# Patient Record
Sex: FEMALE | Race: WHITE | NOT HISPANIC OR LATINO
[De-identification: names, ages, dates, MRNs, and addresses within clinical notes are randomized per-mention and may not be internally consistent; named-entity substitution may affect disease eponyms.]

---

## 2018-07-23 ENCOUNTER — APPOINTMENT (OUTPATIENT)
Dept: RADIOLOGY | Facility: IMAGING CENTER | Age: 58
End: 2018-07-23
Payer: COMMERCIAL

## 2018-07-23 ENCOUNTER — OUTPATIENT (OUTPATIENT)
Dept: OUTPATIENT SERVICES | Facility: HOSPITAL | Age: 58
LOS: 1 days | End: 2018-07-23
Payer: COMMERCIAL

## 2018-07-23 ENCOUNTER — APPOINTMENT (OUTPATIENT)
Dept: MAMMOGRAPHY | Facility: IMAGING CENTER | Age: 58
End: 2018-07-23
Payer: COMMERCIAL

## 2018-07-23 DIAGNOSIS — Z00.8 ENCOUNTER FOR OTHER GENERAL EXAMINATION: ICD-10-CM

## 2018-07-23 PROCEDURE — 77067 SCR MAMMO BI INCL CAD: CPT | Mod: 26

## 2018-07-23 PROCEDURE — 77063 BREAST TOMOSYNTHESIS BI: CPT

## 2018-07-23 PROCEDURE — 77063 BREAST TOMOSYNTHESIS BI: CPT | Mod: 26

## 2018-07-23 PROCEDURE — 71046 X-RAY EXAM CHEST 2 VIEWS: CPT

## 2018-07-23 PROCEDURE — 71046 X-RAY EXAM CHEST 2 VIEWS: CPT | Mod: 26

## 2018-07-23 PROCEDURE — 77067 SCR MAMMO BI INCL CAD: CPT

## 2021-08-17 ENCOUNTER — APPOINTMENT (OUTPATIENT)
Dept: RADIOLOGY | Facility: IMAGING CENTER | Age: 61
End: 2021-08-17
Payer: OTHER GOVERNMENT

## 2021-08-17 ENCOUNTER — OUTPATIENT (OUTPATIENT)
Dept: OUTPATIENT SERVICES | Facility: HOSPITAL | Age: 61
LOS: 1 days | End: 2021-08-17
Payer: COMMERCIAL

## 2021-08-17 DIAGNOSIS — M47.816 SPONDYLOSIS WITHOUT MYELOPATHY OR RADICULOPATHY, LUMBAR REGION: ICD-10-CM

## 2021-08-17 PROCEDURE — 72110 X-RAY EXAM L-2 SPINE 4/>VWS: CPT | Mod: 26

## 2021-08-17 PROCEDURE — 72110 X-RAY EXAM L-2 SPINE 4/>VWS: CPT

## 2022-11-28 ENCOUNTER — RESULT CHARGE (OUTPATIENT)
Age: 62
End: 2022-11-28

## 2022-11-28 ENCOUNTER — NON-APPOINTMENT (OUTPATIENT)
Age: 62
End: 2022-11-28

## 2022-11-28 ENCOUNTER — APPOINTMENT (OUTPATIENT)
Dept: PULMONOLOGY | Facility: CLINIC | Age: 62
End: 2022-11-28

## 2022-11-28 VITALS
RESPIRATION RATE: 16 BRPM | OXYGEN SATURATION: 93 % | HEART RATE: 86 BPM | WEIGHT: 124 LBS | SYSTOLIC BLOOD PRESSURE: 118 MMHG | BODY MASS INDEX: 17.29 KG/M2 | DIASTOLIC BLOOD PRESSURE: 74 MMHG

## 2022-11-28 DIAGNOSIS — R93.89 ABNORMAL FINDINGS ON DIAGNOSTIC IMAGING OF OTHER SPECIFIED BODY STRUCTURES: ICD-10-CM

## 2022-11-28 LAB — POCT - HEMOGLOBIN (HGB), QUANTITATIVE, TRANSCUTANEOUS: 12.1

## 2022-11-28 PROCEDURE — 94727 GAS DIL/WSHOT DETER LNG VOL: CPT

## 2022-11-28 PROCEDURE — 71046 X-RAY EXAM CHEST 2 VIEWS: CPT

## 2022-11-28 PROCEDURE — 94010 BREATHING CAPACITY TEST: CPT

## 2022-11-28 PROCEDURE — 99205 OFFICE O/P NEW HI 60 MIN: CPT | Mod: 25

## 2022-11-28 PROCEDURE — 99406 BEHAV CHNG SMOKING 3-10 MIN: CPT

## 2022-11-28 PROCEDURE — 99072 ADDL SUPL MATRL&STAF TM PHE: CPT

## 2022-11-28 PROCEDURE — 94729 DIFFUSING CAPACITY: CPT

## 2022-11-28 RX ORDER — LOSARTAN POTASSIUM 25 MG/1
25 TABLET, FILM COATED ORAL
Qty: 90 | Refills: 0 | Status: ACTIVE | COMMUNITY
Start: 2022-11-02

## 2022-11-28 RX ORDER — ALENDRONATE SODIUM 70 MG/1
70 TABLET ORAL
Qty: 4 | Refills: 0 | Status: ACTIVE | COMMUNITY
Start: 2022-11-02

## 2022-11-28 RX ORDER — ATORVASTATIN CALCIUM 40 MG/1
40 TABLET, FILM COATED ORAL
Qty: 90 | Refills: 0 | Status: ACTIVE | COMMUNITY
Start: 2022-09-02

## 2022-11-28 RX ORDER — TIOTROPIUM BROMIDE INHALATION SPRAY 3.12 UG/1
2.5 SPRAY, METERED RESPIRATORY (INHALATION) DAILY
Qty: 1 | Refills: 5 | Status: ACTIVE | COMMUNITY
Start: 2022-11-28 | End: 1900-01-01

## 2022-11-28 RX ORDER — AZITHROMYCIN 250 MG/1
250 TABLET, FILM COATED ORAL
Qty: 6 | Refills: 0 | Status: DISCONTINUED | COMMUNITY
Start: 2022-11-21 | End: 2022-11-28

## 2022-11-28 RX ORDER — VARENICLINE 1 MG/1
1 TABLET, FILM COATED ORAL
Qty: 60 | Refills: 0 | Status: DISCONTINUED | COMMUNITY
Start: 2022-06-08

## 2022-11-28 RX ORDER — BENZONATATE 100 MG/1
100 CAPSULE ORAL
Qty: 45 | Refills: 0 | Status: ACTIVE | COMMUNITY
Start: 2022-11-21

## 2022-11-28 RX ORDER — ALPRAZOLAM 1 MG/1
1 TABLET ORAL
Qty: 120 | Refills: 0 | Status: ACTIVE | COMMUNITY
Start: 2022-11-01

## 2022-11-28 NOTE — REVIEW OF SYSTEMS
[Negative] : Psychiatric [TextBox_3] : HPI [TextBox_30] : HPI [TextBox_44] : HTN [TextBox_144] : osteoporosis

## 2022-11-28 NOTE — REASON FOR VISIT
[Consultation] : a consultation [Abnormal CXR/ Chest CT] : an abnormal CXR/ chest CT [COPD] : COPD [TextBox_44] : s/u URI

## 2022-11-28 NOTE — CONSULT LETTER
[Dear  ___] : Dear ~ROSHAN, [Consult Letter:] : I had the pleasure of evaluating your patient, [unfilled]. [Consult Closing:] : Thank you very much for allowing me to participate in the care of this patient.  If you have any questions, please do not hesitate to contact me. [Sincerely,] : Sincerely, [FreeTextEntry2] : Drew Mccarty MD\par  [FreeTextEntry3] : Bryon Montoya MD FCCP\par

## 2022-11-28 NOTE — PROCEDURE
[FreeTextEntry1] : 11/28/2022\par Pulmonary function testing\par These data demonstrate a mild obstructive ventilatory deficit. There is evidence of mild hyperinflation. There is elevation in the RV/TLC ratio indicative of possible air trapping. There is a mild diffusion impairment. \par \par chest xray from November 22, 2022 reviewed and 2018 compared\par

## 2022-11-28 NOTE — COUNSELING
[Cessation strategies including cessation program discussed] : Cessation strategies including cessation program discussed [Use of nicotine replacement therapies and other medications discussed] : Use of nicotine replacement therapies and other medications discussed [Encouraged to pick a quit date and identify support needed to quit] : Encouraged to pick a quit date and identify support needed to quit [Smoking Cessation Program Referral] : Smoking Cessation Program Referral  [Yes] : Willing to quit smoking

## 2022-11-28 NOTE — DISCUSSION/SUMMARY
[FreeTextEntry1] : COPD mild\par Current every day smoker\par S/p pneumonia.  Clinically and radiographically essentially resolved.\par Chronic cough likely related to tobacco use and mild COPD.

## 2022-11-28 NOTE — ASSESSMENT
[FreeTextEntry1] : Trial Spiriva.  Instructed in proper use.\par F/U 1 year or sooner on a PRN basis.\par Smoking Cessation discussed.\par Yearly screening CT.\par No indication for further antibiotic therapy.\par Will follow-up sooner on a PRN basis.

## 2022-11-28 NOTE — HISTORY OF PRESENT ILLNESS
[Current] : current [>= 20 pack years] : >= 20 pack years [TextBox_4] : SAMINA VANESSA is a 61 year old  F referred for pulmonary evaluation for\par \par About 5 weeks ago had head cold.\par Then developed phlegm thick white.\par Persisted.\par 10 days ago developed purulent secretions and temp to 102.1. Went to PMD sent for CXR told pneumonia and bronchitis.\par Completed zithro and on Benzonatate.\par \par No feels better. Feels essentially baseline\par Mild GREENBERG. GREENBERG approx. 2 flights. \par Has COPD.\par Positive chronic cough.\par \par Past pulmonary history. COPD. \par Occupational Exposure. N\par Family history of pulmonary disease. Grandfather lung carcinoma.\par Recent travel N\par Pets N\par \par Patient gets yearly CT.\par Positive exercise walking. \par \par  [TextBox_11] : .75 [TextBox_13] : 50

## 2023-06-15 ENCOUNTER — EMERGENCY (EMERGENCY)
Facility: HOSPITAL | Age: 63
LOS: 1 days | Discharge: HOME | End: 2023-06-15
Admitting: EMERGENCY MEDICINE
Payer: COMMERCIAL

## 2023-06-15 VITALS
SYSTOLIC BLOOD PRESSURE: 144 MMHG | RESPIRATION RATE: 17 BRPM | HEART RATE: 66 BPM | DIASTOLIC BLOOD PRESSURE: 81 MMHG | OXYGEN SATURATION: 100 % | TEMPERATURE: 98 F

## 2023-06-15 PROCEDURE — 99284 EMERGENCY DEPT VISIT MOD MDM: CPT

## 2023-06-15 RX ORDER — AZITHROMYCIN 500 MG/1
1 TABLET, FILM COATED ORAL
Qty: 1 | Refills: 0
Start: 2023-06-15 | End: 2023-06-19

## 2023-06-15 NOTE — ED PROVIDER NOTE - OBJECTIVE STATEMENT
61 Y/O F w/ PMH of COPD and current every day smoker presents to ER for cough. Has experienced TMAX of 101 oral since yesterday w/ associated runny nose and frontal headache. Taking Tylenol and Theraflu w/ minimal relief. This morning noted pulse ox of 91 when getting out of bed. Contacted PMD who recommended ER for further evaluation. No recent travel or sick contacts. Denies chills, nausea/vomiting/diarrhea, chest pain, palpitations, shortness of breath, abdominal pain, dysuria

## 2023-06-15 NOTE — ED PROVIDER NOTE - PATIENT PORTAL LINK FT
You can access the FollowMyHealth Patient Portal offered by [facility] by registering at the following website: [email or web address]. By joining [employer]’s FollowMyHealth portal, you will also be able to view your health information using other applications (apps) compatible with our system.

## 2023-06-15 NOTE — ED ADULT TRIAGE NOTE - CHIEF COMPLAINT QUOTE
Pt c/o cough and congestion starting yesterday, Pt was worried because 02 saturation at home was 89%. Pt denies SOB. Pt 02 saturation currently 100% RA, Pt very well appearing, Phx COPD

## 2023-06-15 NOTE — ED PROVIDER NOTE - NS ED ROS FT
Constitutional: (-) fever   Head: Normal cephalic, Atraumatic  Eyes/ENT: (-)   Cardiovascular: (-) chest pain, (-) wheezing  Respiratory: (+) cough, (-) shortness of breath  Gastrointestinal: (-) vomiting, (-) diarrhea, (-) abdominal pain  : (-) dysuria   Musculoskeletal: (-) back pain  Integumentary: (-) rash, (-) edema  Neurological: (-)loc  Allergic/Immunologic: (-) pruritus

## 2023-06-15 NOTE — ED PROVIDER NOTE - CLINICAL SUMMARY MEDICAL DECISION MAKING FREE TEXT BOX
63 Y/O F w/ PMH of COPD and current every day smoker presents to ER for cough.   No acute findings on exam  Noted to have oxygen sat at 100% during interview  No work of breathing, accessory muscle use, no acute distress  Declined XR. Will send abx given COPD hx

## 2023-06-29 ENCOUNTER — APPOINTMENT (OUTPATIENT)
Dept: PULMONOLOGY | Facility: CLINIC | Age: 63
End: 2023-06-29
Payer: OTHER GOVERNMENT

## 2023-06-29 VITALS
SYSTOLIC BLOOD PRESSURE: 103 MMHG | HEART RATE: 62 BPM | WEIGHT: 125 LBS | BODY MASS INDEX: 17.5 KG/M2 | OXYGEN SATURATION: 95 % | HEIGHT: 71 IN | DIASTOLIC BLOOD PRESSURE: 65 MMHG

## 2023-06-29 DIAGNOSIS — F17.200 NICOTINE DEPENDENCE, UNSPECIFIED, UNCOMPLICATED: ICD-10-CM

## 2023-06-29 DIAGNOSIS — J44.1 CHRONIC OBSTRUCTIVE PULMONARY DISEASE WITH (ACUTE) EXACERBATION: ICD-10-CM

## 2023-06-29 PROCEDURE — 99407 BEHAV CHNG SMOKING > 10 MIN: CPT

## 2023-06-29 PROCEDURE — 71046 X-RAY EXAM CHEST 2 VIEWS: CPT

## 2023-06-29 PROCEDURE — 99214 OFFICE O/P EST MOD 30 MIN: CPT | Mod: 25

## 2023-06-29 RX ORDER — PREDNISONE 10 MG/1
10 TABLET ORAL
Qty: 12 | Refills: 0 | Status: ACTIVE | COMMUNITY
Start: 2023-06-29 | End: 1900-01-01

## 2023-06-29 RX ORDER — AZITHROMYCIN 250 MG/1
250 TABLET, FILM COATED ORAL
Qty: 1 | Refills: 0 | Status: ACTIVE | COMMUNITY
Start: 2023-06-29 | End: 1900-01-01

## 2023-06-30 RX ORDER — TIOTROPIUM BROMIDE INHALATION SPRAY 3.12 UG/1
2.5 SPRAY, METERED RESPIRATORY (INHALATION)
Qty: 3 | Refills: 5 | Status: ACTIVE | COMMUNITY
Start: 2023-06-30 | End: 1900-01-01

## 2023-07-01 PROBLEM — F17.200 CURRENT EVERY DAY SMOKER: Status: ACTIVE | Noted: 2022-11-28

## 2023-07-01 NOTE — ASSESSMENT
[FreeTextEntry1] : 15 minutes spent in cigarette smoking cessation counseling. Educated patient on deleterious effects and all potential consequences of cigarette smoking, such as COPD, lung cancer, etc. Counseled patient on various smoking cessation techniques, such as nicotine patch, Zyban, acupuncture, etc.  Patient agreed to attempt cigarette smoking cessation.  Will follow up on cigarette smoking cessation on next office visit.\par Start Z-Goyo and prednisone taper for COPD exacerbation.\par Start Incruse for COPD.\par Return for pulmonary follow-up in 3 weeks.\par

## 2023-07-01 NOTE — HISTORY OF PRESENT ILLNESS
[TextBox_4] : Acute visit for cough\par \par Having productive cough x 2 1/2 weeks; was seen in [facility] ER due to low oxygen levels and then was placed on antibiotics by PCP; was feeling better, however, started to have a cough again about 1 week again

## 2023-07-01 NOTE — PROCEDURE
[FreeTextEntry1] : \par  Xray Chest 2 Views PA/Lat             Final\par \par No Documents Attached\par \par \par \par \par   \par Chest x-ray PA and lateral views performed in my office today showed bilateral hyperinflated lungs, consistent with history of COPD, clear lungs, no evidence of infiltrates or pleural effusions. \par \par \par  Ordered by: TESSA JACK       Collected/Examined: 29Jun2023 04:49PM       \par Verification Required       Stage: Final       \par  Performed at: In Office       Performed by: TESSA JACK       Resulted: 29Jun2023 04:49PM       Last Updated: 29Jun2023 04:50PM

## 2023-07-10 ENCOUNTER — APPOINTMENT (OUTPATIENT)
Dept: PULMONOLOGY | Facility: CLINIC | Age: 63
End: 2023-07-10
Payer: OTHER GOVERNMENT

## 2023-07-10 VITALS
HEART RATE: 66 BPM | SYSTOLIC BLOOD PRESSURE: 119 MMHG | DIASTOLIC BLOOD PRESSURE: 73 MMHG | RESPIRATION RATE: 16 BRPM | OXYGEN SATURATION: 94 %

## 2023-07-10 DIAGNOSIS — J44.9 CHRONIC OBSTRUCTIVE PULMONARY DISEASE, UNSPECIFIED: ICD-10-CM

## 2023-07-10 DIAGNOSIS — R05.9 COUGH, UNSPECIFIED: ICD-10-CM

## 2023-07-10 DIAGNOSIS — R91.8 OTHER NONSPECIFIC ABNORMAL FINDING OF LUNG FIELD: ICD-10-CM

## 2023-07-10 LAB — POCT - HEMOGLOBIN (HGB), QUANTITATIVE, TRANSCUTANEOUS: 14.4

## 2023-07-10 PROCEDURE — 88738 HGB QUANT TRANSCUTANEOUS: CPT

## 2023-07-10 PROCEDURE — 94729 DIFFUSING CAPACITY: CPT

## 2023-07-10 PROCEDURE — 99213 OFFICE O/P EST LOW 20 MIN: CPT | Mod: 25

## 2023-07-10 PROCEDURE — 94727 GAS DIL/WSHOT DETER LNG VOL: CPT

## 2023-07-10 PROCEDURE — ZZZZZ: CPT

## 2023-07-10 PROCEDURE — 94618 PULMONARY STRESS TESTING: CPT

## 2023-07-10 PROCEDURE — 94010 BREATHING CAPACITY TEST: CPT

## 2023-07-10 RX ORDER — UMECLIDINIUM 62.5 UG/1
62.5 AEROSOL, POWDER ORAL
Qty: 3 | Refills: 3 | Status: DISCONTINUED | COMMUNITY
Start: 2023-06-29 | End: 2023-07-10

## 2023-07-12 RX ORDER — LEVALBUTEROL TARTRATE 45 UG/1
45 AEROSOL, METERED ORAL
Qty: 3 | Refills: 3 | Status: ACTIVE | COMMUNITY
Start: 2023-07-12 | End: 1900-01-01

## 2023-07-12 NOTE — PROCEDURE
[FreeTextEntry1] : 07/11/2023\par Pulmonary function testing\par FEV1, FVC, and FEV1/FVC are within normal limits. TLC and subdivisions are increased. RV/TLC ratio is increased. Single breath diffusion capacity is normal. \par Compared to November 28, 2022 there is a moderate increase in FEV1.  Minimal change in FVC.  Moderate increase in diffusion capacity.\par \par July 10, 2023 6-minute walk.\par Intact.  No desaturation.

## 2023-07-12 NOTE — ASSESSMENT
[FreeTextEntry1] : Trial of Breztri 2 BID.  Instructed in proper use.\par Albuterol PRN. \par Smoking Cessation discussed. Has quit in past with patch and gum, suggest to retry\par Obtain sputum culture.\par Hold off on further antibiotic therapy pending sputum culture.\par Continue yearly screening CT program.  Due June 2024.\par Will follow-up sooner on a PRN basis.\par \par

## 2023-07-12 NOTE — REVIEW OF SYSTEMS
[Cough] : cough [Dyspnea] : dyspnea [Negative] : Endocrine [TextBox_3] : HPI [TextBox_30] : HPI [TextBox_44] : HTN [TextBox_144] : osteoporosis

## 2023-07-12 NOTE — HISTORY OF PRESENT ILLNESS
[Current] : current [TextBox_4] : \par \par Seen in ER and discharged. \par saw Dr Deshpande end of June with productive cough x 2 1/2 weeks; was seen in [facility]  and given a z Goyo\par took prednisone roselia and started Spiriva 2 puffs daily and finished z Goyo\par still smoking\par  1 PPD\par still with colored mucus slight yellow\par now O2 SATs normal\par no wheeze\par does not have albuterol\par did get screening ct [facility] 6/23 stable nodules, 9 mm nodule right light 3 mm right middle lobe,stable 3 mm nodule along left major fissure Mild airway inflammation. emphysema \par Cough is baseline but mucous is unusual. \par Chest heaviness now resolved. \par \par Had TIA 6/5/23 getting workup. \par Voice a little raspy just since ill. \par \par \par  [TextBox_11] : 1 [TextBox_13] : 50

## 2023-07-12 NOTE — REASON FOR VISIT
[Consultation] : a consultation [Abnormal CXR/ Chest CT] : an abnormal CXR/ chest CT [Cough] : cough [COPD] : COPD [Nicotine Dependence] : nicotine dependence [TextBox_44] : s/u URI

## 2023-10-23 ENCOUNTER — APPOINTMENT (OUTPATIENT)
Dept: PULMONOLOGY | Facility: CLINIC | Age: 63
End: 2023-10-23

## 2024-03-06 ENCOUNTER — RX RENEWAL (OUTPATIENT)
Age: 64
End: 2024-03-06

## 2024-03-11 RX ORDER — ALBUTEROL SULFATE 90 UG/1
108 (90 BASE) INHALANT RESPIRATORY (INHALATION)
Qty: 1 | Refills: 1 | Status: ACTIVE | COMMUNITY
Start: 2023-07-10 | End: 1900-01-01

## 2024-04-12 ENCOUNTER — RX RENEWAL (OUTPATIENT)
Age: 64
End: 2024-04-12

## 2024-04-12 RX ORDER — BUDESONIDE, GLYCOPYRROLATE, AND FORMOTEROL FUMARATE 160; 9; 4.8 UG/1; UG/1; UG/1
160-9-4.8 AEROSOL, METERED RESPIRATORY (INHALATION)
Qty: 10.7 | Refills: 5 | Status: ACTIVE | COMMUNITY
Start: 2023-07-10 | End: 1900-01-01

## 2024-04-26 ENCOUNTER — APPOINTMENT (OUTPATIENT)
Dept: PULMONOLOGY | Facility: CLINIC | Age: 64
End: 2024-04-26

## 2024-07-19 ENCOUNTER — APPOINTMENT (OUTPATIENT)
Dept: PULMONOLOGY | Facility: CLINIC | Age: 64
End: 2024-07-19
Payer: OTHER GOVERNMENT

## 2024-07-19 VITALS
HEIGHT: 71 IN | SYSTOLIC BLOOD PRESSURE: 118 MMHG | WEIGHT: 120 LBS | BODY MASS INDEX: 16.8 KG/M2 | DIASTOLIC BLOOD PRESSURE: 63 MMHG | HEART RATE: 52 BPM | OXYGEN SATURATION: 99 %

## 2024-07-19 DIAGNOSIS — R91.8 OTHER NONSPECIFIC ABNORMAL FINDING OF LUNG FIELD: ICD-10-CM

## 2024-07-19 DIAGNOSIS — J44.9 CHRONIC OBSTRUCTIVE PULMONARY DISEASE, UNSPECIFIED: ICD-10-CM

## 2024-07-19 LAB — POCT - HEMOGLOBIN (HGB), QUANTITATIVE, TRANSCUTANEOUS: 14.3

## 2024-07-19 PROCEDURE — ZZZZZ: CPT

## 2024-07-19 PROCEDURE — 99214 OFFICE O/P EST MOD 30 MIN: CPT | Mod: 25

## 2024-07-19 PROCEDURE — 94729 DIFFUSING CAPACITY: CPT

## 2024-07-19 PROCEDURE — 94010 BREATHING CAPACITY TEST: CPT

## 2024-07-19 PROCEDURE — 88738 HGB QUANT TRANSCUTANEOUS: CPT

## 2024-07-19 PROCEDURE — 94727 GAS DIL/WSHOT DETER LNG VOL: CPT

## 2024-11-04 ENCOUNTER — APPOINTMENT (OUTPATIENT)
Dept: PULMONOLOGY | Facility: CLINIC | Age: 64
End: 2024-11-04
Payer: COMMERCIAL

## 2024-11-04 VITALS — SYSTOLIC BLOOD PRESSURE: 117 MMHG | DIASTOLIC BLOOD PRESSURE: 72 MMHG | HEART RATE: 50 BPM | OXYGEN SATURATION: 97 %

## 2024-11-04 DIAGNOSIS — J44.9 CHRONIC OBSTRUCTIVE PULMONARY DISEASE, UNSPECIFIED: ICD-10-CM

## 2024-11-04 DIAGNOSIS — J44.1 CHRONIC OBSTRUCTIVE PULMONARY DISEASE WITH (ACUTE) EXACERBATION: ICD-10-CM

## 2024-11-04 PROCEDURE — 99214 OFFICE O/P EST MOD 30 MIN: CPT | Mod: 25

## 2024-11-04 PROCEDURE — 94010 BREATHING CAPACITY TEST: CPT

## 2024-11-04 PROCEDURE — 71046 X-RAY EXAM CHEST 2 VIEWS: CPT

## 2024-11-04 RX ORDER — PREDNISONE 10 MG/1
10 TABLET ORAL
Qty: 30 | Refills: 0 | Status: ACTIVE | COMMUNITY
Start: 2024-11-04 | End: 1900-01-01

## 2024-12-13 ENCOUNTER — APPOINTMENT (OUTPATIENT)
Dept: PULMONOLOGY | Facility: CLINIC | Age: 64
End: 2024-12-13

## 2025-02-11 RX ORDER — FLUTICASONE FUROATE, UMECLIDINIUM BROMIDE AND VILANTEROL TRIFENATATE 200; 62.5; 25 UG/1; UG/1; UG/1
200-62.5-25 POWDER RESPIRATORY (INHALATION)
Qty: 1 | Refills: 5 | Status: ACTIVE | COMMUNITY
Start: 2025-02-11 | End: 1900-01-01

## 2025-05-02 ENCOUNTER — NON-APPOINTMENT (OUTPATIENT)
Age: 65
End: 2025-05-02

## 2025-05-02 ENCOUNTER — APPOINTMENT (OUTPATIENT)
Dept: PULMONOLOGY | Facility: CLINIC | Age: 65
End: 2025-05-02
Payer: OTHER GOVERNMENT

## 2025-05-02 VITALS — OXYGEN SATURATION: 94 % | HEART RATE: 58 BPM | DIASTOLIC BLOOD PRESSURE: 75 MMHG | SYSTOLIC BLOOD PRESSURE: 124 MMHG

## 2025-05-02 DIAGNOSIS — J44.9 CHRONIC OBSTRUCTIVE PULMONARY DISEASE, UNSPECIFIED: ICD-10-CM

## 2025-05-02 DIAGNOSIS — R93.89 ABNORMAL FINDINGS ON DIAGNOSTIC IMAGING OF OTHER SPECIFIED BODY STRUCTURES: ICD-10-CM

## 2025-05-02 LAB
FLUAV SPEC QL CULT: NEGATIVE
FLUBV AG SPEC QL IA: NEGATIVE
S PYO AG SPEC QL IA: NEGATIVE
SARS-COV-2 AG RESP QL IA.RAPID: NEGATIVE

## 2025-05-02 PROCEDURE — 99214 OFFICE O/P EST MOD 30 MIN: CPT

## 2025-05-02 PROCEDURE — 87804 INFLUENZA ASSAY W/OPTIC: CPT | Mod: QW

## 2025-05-02 PROCEDURE — 87880 STREP A ASSAY W/OPTIC: CPT | Mod: QW

## 2025-05-02 PROCEDURE — 71046 X-RAY EXAM CHEST 2 VIEWS: CPT

## 2025-05-02 PROCEDURE — G2211 COMPLEX E/M VISIT ADD ON: CPT

## 2025-05-02 PROCEDURE — 87811 SARS-COV-2 COVID19 W/OPTIC: CPT | Mod: QW

## 2025-05-02 RX ORDER — LEVOFLOXACIN 500 MG/1
500 TABLET, FILM COATED ORAL DAILY
Qty: 7 | Refills: 0 | Status: ACTIVE | COMMUNITY
Start: 2025-05-02 | End: 1900-01-01

## 2025-06-03 ENCOUNTER — APPOINTMENT (OUTPATIENT)
Dept: PULMONOLOGY | Facility: CLINIC | Age: 65
End: 2025-06-03
Payer: OTHER GOVERNMENT

## 2025-06-03 VITALS — DIASTOLIC BLOOD PRESSURE: 70 MMHG | SYSTOLIC BLOOD PRESSURE: 110 MMHG | HEART RATE: 63 BPM | OXYGEN SATURATION: 98 %

## 2025-06-03 DIAGNOSIS — R05.9 COUGH, UNSPECIFIED: ICD-10-CM

## 2025-06-03 DIAGNOSIS — J01.90 ACUTE SINUSITIS, UNSPECIFIED: ICD-10-CM

## 2025-06-03 DIAGNOSIS — J44.9 CHRONIC OBSTRUCTIVE PULMONARY DISEASE, UNSPECIFIED: ICD-10-CM

## 2025-06-03 DIAGNOSIS — G47.33 OBSTRUCTIVE SLEEP APNEA (ADULT) (PEDIATRIC): ICD-10-CM

## 2025-06-03 PROCEDURE — 94010 BREATHING CAPACITY TEST: CPT

## 2025-06-03 PROCEDURE — 99214 OFFICE O/P EST MOD 30 MIN: CPT | Mod: 25

## 2025-06-03 RX ORDER — METHYLPREDNISOLONE 4 MG/1
4 TABLET ORAL
Qty: 1 | Refills: 1 | Status: ACTIVE | COMMUNITY
Start: 2025-06-03 | End: 1900-01-01

## 2025-06-03 RX ORDER — AZITHROMYCIN 250 MG/1
250 TABLET, FILM COATED ORAL
Qty: 10 | Refills: 0 | Status: ACTIVE | COMMUNITY
Start: 2025-06-03 | End: 1900-01-01

## 2025-06-04 PROBLEM — J01.90 ACUTE NON-RECURRENT SINUSITIS, UNSPECIFIED LOCATION: Status: ACTIVE | Noted: 2025-06-04 | Resolved: 2025-07-04

## 2025-06-13 ENCOUNTER — NON-APPOINTMENT (OUTPATIENT)
Age: 65
End: 2025-06-13

## 2025-06-13 RX ORDER — CIPROFLOXACIN HYDROCHLORIDE 500 MG/1
500 TABLET, FILM COATED ORAL
Qty: 14 | Refills: 0 | Status: ACTIVE | COMMUNITY
Start: 2025-06-13 | End: 1900-01-01

## 2025-06-19 ENCOUNTER — APPOINTMENT (OUTPATIENT)
Dept: OTOLARYNGOLOGY | Facility: CLINIC | Age: 65
End: 2025-06-19
Payer: OTHER GOVERNMENT

## 2025-06-19 VITALS
BODY MASS INDEX: 18.22 KG/M2 | SYSTOLIC BLOOD PRESSURE: 131 MMHG | TEMPERATURE: 98 F | HEIGHT: 69 IN | WEIGHT: 123 LBS | HEART RATE: 75 BPM | DIASTOLIC BLOOD PRESSURE: 81 MMHG

## 2025-06-19 PROBLEM — R44.2 PHANTOSMIA: Status: ACTIVE | Noted: 2025-06-19

## 2025-06-19 PROBLEM — R09.81 NASAL CONGESTION: Status: ACTIVE | Noted: 2025-06-19

## 2025-06-19 PROBLEM — R09.82 PND (POST-NASAL DRIP): Status: ACTIVE | Noted: 2025-06-19

## 2025-06-19 PROBLEM — J34.2 DNS (DEVIATED NASAL SEPTUM): Status: ACTIVE | Noted: 2025-06-19

## 2025-06-19 PROBLEM — J34.89 SINUS PRESSURE: Status: ACTIVE | Noted: 2025-06-19

## 2025-06-19 PROCEDURE — 31231 NASAL ENDOSCOPY DX: CPT

## 2025-06-19 PROCEDURE — 99203 OFFICE O/P NEW LOW 30 MIN: CPT | Mod: 25

## 2025-06-19 RX ORDER — AZELASTINE HYDROCHLORIDE 137 UG/1
0.1 SPRAY, METERED NASAL TWICE DAILY
Qty: 3 | Refills: 3 | Status: ACTIVE | COMMUNITY
Start: 2025-06-19 | End: 1900-01-01

## 2025-06-20 ENCOUNTER — EMERGENCY (EMERGENCY)
Facility: HOSPITAL | Age: 65
LOS: 1 days | End: 2025-06-20
Attending: EMERGENCY MEDICINE | Admitting: EMERGENCY MEDICINE
Payer: COMMERCIAL

## 2025-06-20 VITALS
WEIGHT: 121.92 LBS | RESPIRATION RATE: 16 BRPM | TEMPERATURE: 98 F | HEART RATE: 78 BPM | DIASTOLIC BLOOD PRESSURE: 76 MMHG | OXYGEN SATURATION: 96 % | SYSTOLIC BLOOD PRESSURE: 118 MMHG

## 2025-06-20 LAB
ALBUMIN SERPL ELPH-MCNC: 3.8 G/DL — SIGNIFICANT CHANGE UP (ref 3.3–5)
ALP SERPL-CCNC: 55 U/L — SIGNIFICANT CHANGE UP (ref 40–120)
ALT FLD-CCNC: 21 U/L — SIGNIFICANT CHANGE UP (ref 4–33)
ANION GAP SERPL CALC-SCNC: 9 MMOL/L — SIGNIFICANT CHANGE UP (ref 7–14)
AST SERPL-CCNC: 30 U/L — SIGNIFICANT CHANGE UP (ref 4–32)
BASOPHILS # BLD AUTO: 0.06 K/UL — SIGNIFICANT CHANGE UP (ref 0–0.2)
BASOPHILS NFR BLD AUTO: 0.7 % — SIGNIFICANT CHANGE UP (ref 0–2)
BILIRUB SERPL-MCNC: 0.4 MG/DL — SIGNIFICANT CHANGE UP (ref 0.2–1.2)
BLOOD GAS VENOUS COMPREHENSIVE RESULT: SIGNIFICANT CHANGE UP
BUN SERPL-MCNC: 12 MG/DL — SIGNIFICANT CHANGE UP (ref 7–23)
CALCIUM SERPL-MCNC: 9.1 MG/DL — SIGNIFICANT CHANGE UP (ref 8.4–10.5)
CHLORIDE SERPL-SCNC: 105 MMOL/L — SIGNIFICANT CHANGE UP (ref 98–107)
CO2 SERPL-SCNC: 24 MMOL/L — SIGNIFICANT CHANGE UP (ref 22–31)
CREAT SERPL-MCNC: 0.7 MG/DL — SIGNIFICANT CHANGE UP (ref 0.5–1.3)
EGFR: 95 ML/MIN/1.73M2 — SIGNIFICANT CHANGE UP
EGFR: 95 ML/MIN/1.73M2 — SIGNIFICANT CHANGE UP
EOSINOPHIL # BLD AUTO: 0.1 K/UL — SIGNIFICANT CHANGE UP (ref 0–0.5)
EOSINOPHIL NFR BLD AUTO: 1.2 % — SIGNIFICANT CHANGE UP (ref 0–6)
FLUAV AG NPH QL: SIGNIFICANT CHANGE UP
FLUBV AG NPH QL: SIGNIFICANT CHANGE UP
GLUCOSE SERPL-MCNC: 101 MG/DL — HIGH (ref 70–99)
HCT VFR BLD CALC: 40.3 % — SIGNIFICANT CHANGE UP (ref 34.5–45)
HGB BLD-MCNC: 12.9 G/DL — SIGNIFICANT CHANGE UP (ref 11.5–15.5)
IMM GRANULOCYTES # BLD AUTO: 0.02 K/UL — SIGNIFICANT CHANGE UP (ref 0–0.07)
IMM GRANULOCYTES NFR BLD AUTO: 0.2 % — SIGNIFICANT CHANGE UP (ref 0–0.9)
LYMPHOCYTES # BLD AUTO: 1.62 K/UL — SIGNIFICANT CHANGE UP (ref 1–3.3)
LYMPHOCYTES NFR BLD AUTO: 19.3 % — SIGNIFICANT CHANGE UP (ref 13–44)
MCHC RBC-ENTMCNC: 31.8 PG — SIGNIFICANT CHANGE UP (ref 27–34)
MCHC RBC-ENTMCNC: 32 G/DL — SIGNIFICANT CHANGE UP (ref 32–36)
MCV RBC AUTO: 99.3 FL — SIGNIFICANT CHANGE UP (ref 80–100)
MONOCYTES # BLD AUTO: 0.69 K/UL — SIGNIFICANT CHANGE UP (ref 0–0.9)
MONOCYTES NFR BLD AUTO: 8.2 % — SIGNIFICANT CHANGE UP (ref 2–14)
NEUTROPHILS # BLD AUTO: 5.89 K/UL — SIGNIFICANT CHANGE UP (ref 1.8–7.4)
NEUTROPHILS NFR BLD AUTO: 70.4 % — SIGNIFICANT CHANGE UP (ref 43–77)
NRBC # BLD AUTO: 0 K/UL — SIGNIFICANT CHANGE UP (ref 0–0)
NRBC # FLD: 0 K/UL — SIGNIFICANT CHANGE UP (ref 0–0)
NRBC BLD AUTO-RTO: 0 /100 WBCS — SIGNIFICANT CHANGE UP (ref 0–0)
PLATELET # BLD AUTO: 155 K/UL — SIGNIFICANT CHANGE UP (ref 150–400)
PMV BLD: 12.2 FL — SIGNIFICANT CHANGE UP (ref 7–13)
POTASSIUM SERPL-MCNC: 4.1 MMOL/L — SIGNIFICANT CHANGE UP (ref 3.5–5.3)
POTASSIUM SERPL-SCNC: 4.1 MMOL/L — SIGNIFICANT CHANGE UP (ref 3.5–5.3)
PROT SERPL-MCNC: 6.3 G/DL — SIGNIFICANT CHANGE UP (ref 6–8.3)
RBC # BLD: 4.06 M/UL — SIGNIFICANT CHANGE UP (ref 3.8–5.2)
RBC # FLD: 14 % — SIGNIFICANT CHANGE UP (ref 10.3–14.5)
RSV RNA NPH QL NAA+NON-PROBE: SIGNIFICANT CHANGE UP
SARS-COV-2 RNA SPEC QL NAA+PROBE: SIGNIFICANT CHANGE UP
SODIUM SERPL-SCNC: 138 MMOL/L — SIGNIFICANT CHANGE UP (ref 135–145)
SOURCE RESPIRATORY: SIGNIFICANT CHANGE UP
TROPONIN T, HIGH SENSITIVITY RESULT: 12 NG/L — SIGNIFICANT CHANGE UP
WBC # BLD: 8.38 K/UL — SIGNIFICANT CHANGE UP (ref 3.8–10.5)
WBC # FLD AUTO: 8.38 K/UL — SIGNIFICANT CHANGE UP (ref 3.8–10.5)

## 2025-06-20 PROCEDURE — 70496 CT ANGIOGRAPHY HEAD: CPT | Mod: 26

## 2025-06-20 PROCEDURE — 71046 X-RAY EXAM CHEST 2 VIEWS: CPT | Mod: 26

## 2025-06-20 PROCEDURE — 99285 EMERGENCY DEPT VISIT HI MDM: CPT

## 2025-06-20 PROCEDURE — 70498 CT ANGIOGRAPHY NECK: CPT | Mod: 26

## 2025-06-20 RX ORDER — ONDANSETRON HCL/PF 4 MG/2 ML
4 VIAL (ML) INJECTION ONCE
Refills: 0 | Status: COMPLETED | OUTPATIENT
Start: 2025-06-20 | End: 2025-06-20

## 2025-06-20 RX ORDER — SODIUM CHLORIDE 9 G/1000ML
1000 INJECTION, SOLUTION INTRAVENOUS ONCE
Refills: 0 | Status: COMPLETED | OUTPATIENT
Start: 2025-06-20 | End: 2025-06-20

## 2025-06-20 RX ORDER — IPRATROPIUM BROMIDE AND ALBUTEROL SULFATE .5; 2.5 MG/3ML; MG/3ML
3 SOLUTION RESPIRATORY (INHALATION) ONCE
Refills: 0 | Status: COMPLETED | OUTPATIENT
Start: 2025-06-20 | End: 2025-06-20

## 2025-06-20 RX ADMIN — SODIUM CHLORIDE 1000 MILLILITER(S): 9 INJECTION, SOLUTION INTRAVENOUS at 19:31

## 2025-06-20 RX ADMIN — IPRATROPIUM BROMIDE AND ALBUTEROL SULFATE 3 MILLILITER(S): .5; 2.5 SOLUTION RESPIRATORY (INHALATION) at 20:08

## 2025-06-20 RX ADMIN — Medication 4 MILLIGRAM(S): at 19:31

## 2025-06-20 NOTE — CONSULT NOTE ADULT - SUBJECTIVE AND OBJECTIVE BOX
Neurology - Consult Note    -  Spectra: [phone] ([facility]), [phone] ([facility])  -    HPI: SAMINA VANESSA, 64y (1960) F w/ PMHx significant for ?TIA vs CVA, HTN, HLD, hypertriglyceridemia, SVT presents to the ED due to a sinus infection with cultures positive for E.coli per advice of her ENT in order to obtain a CT scan of her sinuses as she continues to produce green sputum despite 3 antibiotic courses.    Patient reported an episode at 3:30 AM 6/20/25 where she woke up out of sleep with LUE twitching lasting at least 15 seconds before she went back to sleep. She went back to sleep with her LUE still twitching. When she woke, the twitching had stopped though about an hour after waking she became acutely nauseated and had to lay down until around 1:00 PM. At that time she also had an episode of confusion leading her to be unable to perform tasks she was attempting such as paying bills on her phone with associated speech difficulty. This lasted at least an hour. Her  checked her heart rate at the time which was 200 BPM.    Regarding her prior stroke vs. TIA - Patient's first episode was a period of unremitting dizziness which lasted 11 hours during which she was also unable to perform math tasks correctly and she had significant confusion with speech difficulty. She was discovered to have what she reports to be "30% narrowing of the arteries in the back of my head". The second episode was an episode of painless transient visual obscuration of the b/l lower visual fields without headache. She was started on daily [id number] which she still takes. Patient states these were called strokes despite her reporting negative MRI's and transient symptoms. No imaging of these events on file here.    Currently patient reports resolution of her presenting neurologic symptoms. She does report occasional random episodes of laughter occurring once a month which also occurred today. Patient denies headache, nausea, vomiting, slurred speech, vision changes, hearing changes, focal weakness or focal numbness.    Review of Systems:  All other review of systems is negative unless indicated above.    Allergies:  penicillin (Diarrhea)      PMHx/PSHx/Family Hx: As above, otherwise see below   No pertinent past medical history        Social Hx:  No current use of tobacco, alcohol, or illicit drugs    Medications:  MEDICATIONS  (STANDING):    MEDICATIONS  (PRN):      Vitals:  T(C): 36.8 (06-20-25 @ 17:35), Max: 36.8 (06-20-25 @ 17:35)  HR: 78 (06-20-25 @ 17:35) (78 - 78)  BP: 118/76 (06-20-25 @ 17:35) (118/76 - 118/76)  RR: 16 (06-20-25 @ 17:35) (16 - 16)  SpO2: 96% (06-20-25 @ 17:35) (96% - 96%)    Physical Examination:  General - NAD  Cardiovascular - Peripheral pulses palpable, no edema    Neurologic Exam:  Mental status - Awake, Alert, Oriented to person, place, and time. Speech fluent, repetition and naming intact. Follows simple and complex commands.    Cranial nerves - PERRLA, VFF, EOMI, face sensation (V1-V3) intact b/l, facial strength intact without asymmetry b/l, hearing intact b/l, palate with symmetric elevation, trapezius 5/5 strength b/l, tongue midline on protrusion with full lateral movement    Motor - Normal bulk and tone throughout. No pronator drift.  Strength testing            R        Deltoid:  5    Biceps:  5    Triceps:  5    :  5    Hip Flexion:  5    Hip Extension:  5    Knee Flexion:  5    Knee Extension:  5    Dorsiflexion:  5    Plantar Flexion:  5        L        Deltoid:  5    Biceps:  5    Triceps:  5    :  5    Hip Flexion:  5    Hip Extension:  5    Knee Flexion:  5    Knee Extension:  5    Dorsiflexion:  5    Plantar Flexion:  5      Sensation - Light touch intact throughout    DTR's -               R  Biceps:  2+    Triceps:  2+    Brachioradialis:  2+    Patellar:  2+    Ankle:  2+    L  Biceps:  2+    Triceps:  2+    Brachioradialis:  2+    Patellar:  2+    Ankle:  2+    Coordination - Finger to Nose intact b/l. No tremors appreciated    Gait and station - Gait not tested due to patient safety concerns    Labs:                        12.9   8.38  )-----------( 155      ( 20 Jun 2025 19:40 )             40.3     06-20    138  |  105  |  12  ----------------------------<  101[H]  4.1   |  24  |  0.70    Ca    9.1      20 Jun 2025 19:40    TPro  6.3  /  Alb  3.8  /  TBili  0.4  /  DBili  x   /  AST  30  /  ALT  21  /  AlkPhos  55  06-20    CAPILLARY BLOOD GLUCOSE        LIVER FUNCTIONS - ( 20 Jun 2025 19:40 )  Alb: 3.8 g/dL / Pro: 6.3 g/dL / ALK PHOS: 55 U/L / ALT: 21 U/L / AST: 30 U/L / GGT: x               CSF:                  Radiology:

## 2025-06-20 NOTE — ED PROVIDER NOTE - NSICDXPASTMEDICALHX_GEN_ALL_CORE_FT
PAST MEDICAL HISTORY:  Brain TIA     History of low back pain     SVT (supraventricular tachycardia)

## 2025-06-20 NOTE — ED PROVIDER NOTE - PATIENT PORTAL LINK FT
You can access the FollowMyHealth Patient Portal offered by [facility] by registering at the following website: [email or web address]. By joining [employer]’s FollowMyHealth portal, you will also be able to view your health information using other applications (apps) compatible with our system.

## 2025-06-20 NOTE — ED ADULT NURSE REASSESSMENT NOTE - NS ED NURSE REASSESS COMMENT FT1
Pt at baseline mental status, breathing even and unlabored in bed. Pt denies chest pain, SOB, headache, dizziness, blurry vision, N/V and chills at this time. Pt endorses no complaints currently. Bed in lowest position, call bell within reach. All safety precautions in place. pending DC,

## 2025-06-20 NOTE — ED PROVIDER NOTE - OBJECTIVE STATEMENT
64F p/w episode of L arm twitching at 330AM.  LKN 1AM went to bed.  Today during the day nausea.  Pt at 1pm was using phone to pay her bills and was confused and unable to process the information, so stopped.  Pt then had some R traps area neck pain.  Pt also noted on pulse ox her HR was 200 - she has a hx of SVT.  Pt over the past few months has been dealing with sinus issues and SOB and has had multiple rounds of abx and steroids.  Pt had e-coli in sputum 1 week ago, and finished a course of cipro.  No fever.  Pt saw ENT yesterday and was started on flonase and azelastine.  No short of breath at present.  Still smokes.  PMHX SVT, CVA, chronic LBP (symptoms were dizziness and word finding difficulty/cognition).  All PCN.  On exam anxious but NAD.  Face no tenderness or swelling. Speech fluent, AAOx3.  Motor 5/5 UE, LE bilat,  L lateral shin decreased sensation but intact to bilat feet.  Impression possible TIA/CVA, possible recrudescence however pt completed course of cipro today - neuro consult.  Lungs clear but will check xray.  Check CTA H/N, check labs, give fluids, zofran.  Pt concerned about e-coli in sputum but pt completed abx, is nontoxic appearing, is afebrile.  Will check CXR eval for pna, if no concerning lab findings can follow up with PMD/pulm.    NOTE INCOMPLETE 64F p/w episode of L arm twitching at 330AM.  LKN 1AM went to bed.  Today during the day nausea.  Pt at 1pm was using phone to pay her bills and was confused and unable to process the information, so stopped.  Pt then had some R traps area neck pain.  Pt also noted on pulse ox her HR was 200 - she has a hx of SVT.  Pt over the past few months has been dealing with sinus issues and SOB and has had multiple rounds of abx and steroids.  Pt had e-coli in sputum 1 week ago, and finished a course of cipro.  No fever.  Pt saw ENT yesterday and was started on flonase and azelastine.  No short of breath at present.  Still smokes.  PMHX SVT, CVA, chronic LBP (symptoms were dizziness and word finding difficulty/cognition).  All PCN.  On exam anxious but NAD.  Face no tenderness or swelling. Speech fluent, AAOx3.  Motor 5/5 UE, LE bilat,  L lateral shin decreased sensation but intact to bilat feet.  Impression possible TIA/CVA, possible recrudescence however pt completed course of cipro today and does not appear to have encephalopathy - neuro consult.  Lungs clear but will check xray.  Check CTA H/N, check labs, give fluids, zofran.  Pt concerned about e-coli in sputum but pt completed abx, is nontoxic appearing, is afebrile.  Will check CXR eval for pna, if no concerning lab findings can follow up with PMD/pulm.

## 2025-06-20 NOTE — ED PROVIDER NOTE - PROGRESS NOTE DETAILS
DD ED ATTG: feeling better, in NAD.  D/w neuro - no acute intervention, pt can follow up as outpt.  Reccs discussed at length with pt, who has a neurologist Dr William.  Pt has plans to get CT sinuses as outpt.

## 2025-06-20 NOTE — ED PROVIDER NOTE - PHYSICAL EXAMINATION
VS:  unremarkable    GEN - NAD;   well appearing;   A+O x3  Occ cough  HEAD - NC/AT     ENT - PEERL, EOMI, mucous membranes dry, no discharge    no sinus tenderness or face deformity.    NECK: Neck supple, non-tender without lymphadenopathy, no masses, no JVD  PULM - occ crackles,  symmetric breath sounds  COR -  normal heart sounds    ABD - , ND, NT, soft,  BACK - no CVA tenderness, nontender spine     EXTREMS - no edema, no deformity, warm and well perfused    SKIN - no rash    or bruising      NEUROLOGIC - alert, face symmetric, speech fluent, sensation nl except decreased to LLE, motor no focal deficit.

## 2025-06-20 NOTE — ED ADULT NURSE NOTE - OBJECTIVE STATEMENT
a&ox3,  c/o feeling congested on the chest with mucus, intermittently having trouble focusing and finding words, generally not feeling well. had + e coli in the sputum and finished meds. respirations even and unlabored. no s/s of distress noted. labs sent. waiting for radiology eval

## 2025-06-20 NOTE — ED PROVIDER NOTE - NSFOLLOWUPINSTRUCTIONS_ED_ALL_ED_FT
FOLLOW UP WITH YOUR NEUROLOGY WITHIN 1 WEEK - SEE LIST  BRING THE COPIES OF YOUR RESULTS WITH YOU (PROVIDED)  CAN TAKE TYLENOL 650MG ORALLY EVERY 6 HOURS FOR PAIN OR FEVER.  RETURN TO ED FOR NEW OR WORSENING SYMPTOMS. FOLLOW UP WITH YOUR NEUROLOGY WITHIN 1 WEEK - SEE LIST OR CALL AS FOLLOWS -  neurology at [address] [place] [address] 1-2 weeks after discharge. Please instruct the patient to call [phone] to schedule this appointment.  Neuro reccomends - EEG -  can be outpatient; MRI brain w/wo contrast epilepsy protocol can be outpatient,  CONTINUE HOME ASPIRIN.  BRING THE COPIES OF YOUR RESULTS WITH YOU (PROVIDED)  CAN TAKE TYLENOL 650MG ORALLY EVERY 6 HOURS FOR PAIN OR FEVER.  RETURN TO ED FOR NEW OR WORSENING SYMPTOMS.

## 2025-06-20 NOTE — CONSULT NOTE ADULT - ASSESSMENT
64 Y.o F w/ ?TIA vs CVA, HTN, HLD, hypertriglyceridemia, SVT presents to the ED for CTH due to a sinus infection. She reports LUE transient twitching of 15 seconds which woke her from sleep and persisted while she went back to sleep. She also had a transient pariod of confusion with speech and math difficulties of more an hr the same day. She is on ASA 81 mg daily for  64 Y.o F w/ ?TIA vs CVA, HTN, HLD, hypertriglyceridemia, SVT presents to the ED for CTH due to a sinus infection. She reports LUE transient twitching of 15 seconds which woke her from sleep and persisted while she went back to sleep. She also had a transient period of confusion with speech and math difficulties of more an hr the same day. She is on ASA 81 mg daily for the reported CVA vs. TIA which presented as visual disturbances, dizziness, speech difficulties, math issues and confusion though patient reports negative MRI for the events so more likely TIA. She had an elevated HR during the confusional episode. She has full memory of both events.    Impression:  Transient episode of confusion with math difficulty concerning for recrudescence i/s/o toxic/ metabolic/ infectious stressor vs. less likely possible seizures LUE twitching as focal motor aware and confusional/ math difficulty/ speech difficulty episode as focal aware.    Recommendations:    [] aEEG can be outpatient  [] MRI brain w/wo contrast epilepsy protocol can be outpatient  [] Medical management of patient's presenting sinus issues if indicated per primary team  [] Continue home ASA 81 mg daily  [] Patient can follow up with neurology at [address] [place] [address] 1-2 weeks after discharge. Please instruct the patient to call [phone] to schedule this appointment.    Case dsicussed with epilepsy fellow Dr. Iqbal. Note finalized on attending attestation.

## 2025-06-20 NOTE — ED PROVIDER NOTE - CLINICAL SUMMARY MEDICAL DECISION MAKING FREE TEXT BOX
64F p/w episode of L arm twitching at 330AM.  LKN 1AM went to bed.  Today during the day nausea.  Pt at 1pm was using phone to pay her bills and was confused and unable to process the information, so stopped.  Pt then had some R traps area neck pain.  Pt also noted on pulse ox her HR was 200 - she has a hx of SVT.  Pt over the past few months has been dealing with sinus issues and SOB and has had multiple rounds of abx and steroids.  Pt had e-coli in sputum 1 week ago, and finished a course of cipro.  No fever.  Pt saw ENT yesterday and was started on flonase and azelastine.  No short of breath at present.  Still smokes.  PMHX SVT, CVA, chronic LBP (symptoms were dizziness and word finding difficulty/cognition).  All PCN.  On exam anxious but NAD.  Face no tenderness or swelling. Speech fluent, AAOx3.  Motor 5/5 UE, LE bilat,  L lateral shin decreased sensation but intact to bilat feet.  Impression possible TIA/CVA, possible recrudescence however pt completed course of cipro today and does not appear to have encephalopathy - neuro consult.  Lungs clear but will check xray.  Check CTA H/N, check labs, give fluids, zofran.  Pt concerned about e-coli in sputum but pt completed abx, is nontoxic appearing, is afebrile.  Will check CXR eval for pna, if no concerning lab findings can follow up with PMD/pulm.

## 2025-06-20 NOTE — ED ADULT TRIAGE NOTE - CHIEF COMPLAINT QUOTE
Pt had sputum culture from pulmonologist office which came back positive for E. coli. History of HTN, SVT, CVA without residual.

## 2025-06-23 RX ORDER — MOMETASONE 50 UG/1
50 SPRAY, METERED NASAL DAILY
Qty: 3 | Refills: 1 | Status: ACTIVE | COMMUNITY
Start: 2025-06-23 | End: 1900-01-01

## 2025-07-18 ENCOUNTER — APPOINTMENT (OUTPATIENT)
Dept: PULMONOLOGY | Facility: CLINIC | Age: 65
End: 2025-07-18
Payer: OTHER GOVERNMENT

## 2025-07-18 VITALS — DIASTOLIC BLOOD PRESSURE: 69 MMHG | HEART RATE: 67 BPM | OXYGEN SATURATION: 97 % | SYSTOLIC BLOOD PRESSURE: 127 MMHG

## 2025-07-18 PROCEDURE — ZZZZZ: CPT

## 2025-07-18 PROCEDURE — 94729 DIFFUSING CAPACITY: CPT

## 2025-07-18 PROCEDURE — 94060 EVALUATION OF WHEEZING: CPT

## 2025-07-18 PROCEDURE — 94727 GAS DIL/WSHOT DETER LNG VOL: CPT

## 2025-07-18 PROCEDURE — 99214 OFFICE O/P EST MOD 30 MIN: CPT | Mod: 25

## 2025-09-05 ENCOUNTER — APPOINTMENT (OUTPATIENT)
Dept: PULMONOLOGY | Facility: CLINIC | Age: 65
End: 2025-09-05